# Patient Record
(demographics unavailable — no encounter records)

---

## 2021-06-26 NOTE — REP
INDICATION:

L flank pain, h/o kidney stones



COMPARISON:

None



TECHNIQUE:

Axial noncontrast images from the lung bases to the pubic symphysis with coronal and

sagittal reformations.



This CT examination was performed using the following dose reduction techniques:

Automated exposure control, adjustment of mA and/or kv according to the patient's

size, and use of iterative reconstruction technique.



FINDINGS:

Acute left-sided obstructive uropathy including perinephric stranding and

hydroureteronephrosis with a 4 mm obstructing calculus in the mid left ureter.  Right

kidney/ureter and bladder are normal.



Liver, spleen, pancreas, gallbladder, and bilateral adrenal glands are normal.  The

enteric system is without obstruction or acute inflammatory process.  Normal terminal

ileum and appendix identified in the right lower quadrant.  Scattered sigmoid

diverticula without acute diverticulitis.



5 cm fat containing periumbilical hernia.



Pelvis demonstrates normal bladder and age-appropriate prostate/seminal vesicles.  Fat

containing right inguinal hernia noted.



IMPRESSION:

Acute left-sided obstructive uropathy with 4 mm obstructing calculus.

5 cm fat containing periumbilical hernia.

Fat containing right inguinal hernia.





<Electronically signed by Isaak Chavez > 06/26/21 [id number]